# Patient Record
Sex: FEMALE | Race: BLACK OR AFRICAN AMERICAN | NOT HISPANIC OR LATINO | Employment: FULL TIME | URBAN - METROPOLITAN AREA
[De-identification: names, ages, dates, MRNs, and addresses within clinical notes are randomized per-mention and may not be internally consistent; named-entity substitution may affect disease eponyms.]

---

## 2017-02-08 ENCOUNTER — OFFICE VISIT (OUTPATIENT)
Dept: OBGYN | Facility: CLINIC | Age: 42
End: 2017-02-08
Payer: COMMERCIAL

## 2017-02-08 VITALS
HEIGHT: 67 IN | WEIGHT: 165 LBS | BODY MASS INDEX: 25.9 KG/M2 | DIASTOLIC BLOOD PRESSURE: 70 MMHG | SYSTOLIC BLOOD PRESSURE: 100 MMHG

## 2017-02-08 DIAGNOSIS — Z01.419 ENCOUNTER FOR GYNECOLOGICAL EXAMINATION WITHOUT ABNORMAL FINDING: Primary | ICD-10-CM

## 2017-02-08 PROCEDURE — G0145 SCR C/V CYTO,THINLAYER,RESCR: HCPCS | Performed by: OBSTETRICS & GYNECOLOGY

## 2017-02-08 PROCEDURE — 87591 N.GONORRHOEAE DNA AMP PROB: CPT | Performed by: OBSTETRICS & GYNECOLOGY

## 2017-02-08 PROCEDURE — 87624 HPV HI-RISK TYP POOLED RSLT: CPT | Performed by: OBSTETRICS & GYNECOLOGY

## 2017-02-08 PROCEDURE — 87491 CHLMYD TRACH DNA AMP PROBE: CPT | Performed by: OBSTETRICS & GYNECOLOGY

## 2017-02-08 PROCEDURE — 99386 PREV VISIT NEW AGE 40-64: CPT | Performed by: OBSTETRICS & GYNECOLOGY

## 2017-02-08 RX ORDER — LEVONORGESTREL/ETHIN.ESTRADIOL 0.1-0.02MG
1 TABLET ORAL DAILY
COMMUNITY

## 2017-02-08 RX ORDER — ACETAMINOPHEN AND CODEINE PHOSPHATE 120; 12 MG/5ML; MG/5ML
1 SOLUTION ORAL DAILY
Qty: 84 TABLET | Refills: 3 | Status: SHIPPED | OUTPATIENT
Start: 2017-02-08 | End: 2018-01-08

## 2017-02-08 NOTE — NURSING NOTE
Chief Complaint   Patient presents with     Physical       Initial There were no vitals taken for this visit. There is no height or weight on file to calculate BMI.  BP completed using cuff size: regular    No obstetric history on file.    The following HM Due: NONE      The following patient reported/Care Every where data was sent to:  P ABSTRACT QUALITY INITIATIVES [[id number]]  none     n/a

## 2017-02-08 NOTE — MR AVS SNAPSHOT
"              After Visit Summary   2/8/2017    Kirstin Ott    MRN: [id number]           Patient Information     Date Of Birth          1975        Visit Information        Provider Department      2/8/2017 3:30 PM Radha Padilla MD [facility]        Today's Diagnoses     Encounter for gynecological examination without abnormal finding    -  1        Follow-ups after your visit        Future tests that were ordered for you today     Open Future Orders        Priority Expected Expires Ordered    HIV Antigen Antibody Combo Routine  2/8/2018 2/8/2017    Anti Treponema Routine  2/8/2018 2/8/2017    Hepatitis B surface antigen Routine  2/8/2018 2/8/2017    Hepatitis C antibody Routine  2/8/2018 2/8/2017            Who to contact     If you have questions or need follow up information about today's clinic visit or your schedule please contact [facility] directly at [phone].  Normal or non-critical lab and imaging results will be communicated to you by MyChart, letter or phone within 4 business days after the clinic has received the results. If you do not hear from us within 7 days, please contact the clinic through Business e via [place] or phone. If you have a critical or abnormal lab result, we will notify you by phone as soon as possible.  Submit refill requests through [employer] or call your pharmacy and they will forward the refill request to us. Please allow 3 business days for your refill to be completed.          Additional Information About Your Visit        MyChart Information     Ar[employer]os lets you send messages to your doctor, view your test results, renew your prescriptions, schedule appointments and more. To sign up, go to [email or web address] . Click on \"Log in\" on the left side of the screen, which will take you to the Welcome page. Then click on \"Sign up Now\" on the right side of the page.     You will be asked to enter the access code listed below, as well as some personal " "information. Please follow the directions to create your username and password.     Your access code is: [email or web address]  Expires: 2017  4:27 PM     Your access code will  in 90 days. If you need help or a new code, please call your [place] clinic or [phone].        Care EveryWhere ID     This is your Care EveryWhere ID. This could be used by other organizations to access your [place] medical records  [id number]        Your Vitals Were     Height BMI (Body Mass Index) Last Period Breastfeeding?          5' 6.5\" (1.689 m) 26.24 kg/m2 2017 (Approximate) No         Blood Pressure from Last 3 Encounters:   17 100/70    Weight from Last 3 Encounters:   17 165 lb (74.844 kg)              We Performed the Following     CHLAMYDIA TRACHOMATIS PCR     HPV High Risk Types DNA Cervical     NEISSERIA GONORRHOEA PCR     Pap imaged thin layer screen with HPV - recommended age 30 - 65 years (select HPV order below)          Today's Medication Changes          These changes are accurate as of: 17  4:27 PM.  If you have any questions, ask your nurse or doctor.               Start taking these medicines.        Dose/Directions    norethindrone 0.35 MG per tablet   Commonly known as:  MICRONOR   Used for:  Encounter for gynecological examination without abnormal finding   Started by:  Radha Padilla MD        Dose:  1 tablet   Take 1 tablet (0.35 mg) by mouth daily   Quantity:  84 tablet   Refills:  3            Where to get your medicines      These medications were sent to [facility] [address] [place], [place] - [address]  [address], [place] [address]     Phone:  [phone]    - norethindrone 0.35 MG per tablet             Primary Care Provider Office Phone # Fax #    Radha Padilla [phone]       [facility] [address] [facility] [address]  [facility] [address]        Thank you!     Thank you for choosing [facility]  for your care. Our goal is " always to provide you with excellent care. Hearing back from our patients is one way we can continue to improve our services. Please take a few minutes to complete the written survey that you may receive in the mail after your visit with us. Thank you!             Your Updated Medication List - Protect others around you: Learn how to safely use, store and throw away your medicines at www.disposemymeds.org.          This list is accurate as of: 2/8/17  4:27 PM.  Always use your most recent med list.                   Brand Name Dispense Instructions for use    levonorgestrel-ethinyl estradiol 0.1-20 MG-MCG per tablet    BJORN YIN LESSINA     Take 1 tablet by mouth daily       norethindrone 0.35 MG per tablet    MICRONOR    84 tablet    Take 1 tablet (0.35 mg) by mouth daily

## 2017-02-08 NOTE — LETTER
[facility]  [address]  [facility] [address] [phone]      February 16, 2017    Kirstin Ott  [address]  [facility] [address]    Dear Kirstin,  We are happy to inform you that your PAP smear result from 2/8/17 is normal.  We are now able to do a follow up test on PAP smears. The DNA test is for HPV (Human Papilloma Virus). Cervical cancer is closely linked with certain types of HPV. Your result showed no evidence of high risk HPV.  Therefore we recommend you return in 3 years for your next pap smear.  You will still need to return to the clinic every year for an annual exam and other preventive tests.  Please contact the clinic with any questions.  Sincerely,  Radha Padilla MD/kennedi

## 2017-02-08 NOTE — PROGRESS NOTES
Kirstin is a 41 year old female who presents for annual exam.   She has moved here from [place], last pap , will send pap/HPV test today, discussed current screening guidelines.  Originally from [place].  Has been using oral contraceptive pills, but smokes (socially), not ready to quit, discussed alternatives.  Doesn't want a device (Mirena, Nexplanon), or Depo shots (bled in the past with them).  Willing to try progestin-only pills, if she decides to quit smoking could consider combination.  Interested in STD testing, will send GC/CT today, send future HIV/RPR/Hepatitis screen, she will decide if she wants to complete these.  Discussed mammogram guidelines.      Menses are regular q 28-30 days and normal lasting 4 days.  Menses flow: varies.  Patient's last menstrual period was 2017 (approximate).. Using oral contraceptives for contraception.  She is not currently considering pregnancy.  Besides routine health maintenance, she has no other health concerns today .  GYNECOLOGIC HISTORY:  Menarche: unsure    Kirstin is sexually active with 0male partner(s) and is currently in monogamous relationship with no one in particular.    History sexually transmitted infections:No STD history  STI testing offered?  Accepted  RASTA exposure: No  History of abnormal Pap smear: NO - age 30- 65 PAP every 3 years recommended  Family history of breast CA: No  Family history of uterine/ovarian CA: No    Family history of colon CA: No    HEALTH MAINTENANCE:  Cholesterol: (No results found for: CHOL History of abnormal lipids: No  Mammo: 0 . History of abnormal Mammo: Not applicable.  Regular Self Breast Exams: Yes  Calcium/Vitamin D intake: source:  dairy Adequate? Yes  TSH: (No results found for: TSH )  Pap; (No results found for this basename: pap )    HISTORY:  Obstetric History       T0      TAB0   SAB0   E0   M0   L0         Past Medical History   Diagnosis Date     NO ACTIVE PROBLEMS      Past Surgical  "History   Procedure Laterality Date     C nonspecific procedure       ?ovarian cyst in childhood     No family history on file.  Social History     Social History     Marital Status: Single     Spouse Name: N/A     Number of Children: N/A     Years of Education: N/A     Social History Main Topics     Smoking status: Not on file     Smokeless tobacco: Not on file     Alcohol Use: Not on file     Drug Use: Not on file     Sexual Activity: Not on file     Other Topics Concern     Not on file     Social History Narrative     No narrative on file       Current outpatient prescriptions:      levonorgestrel-ethinyl estradiol (AVIANE,ALESSE,LESSINA) 0.1-20 MG-MCG per tablet, Take 1 tablet by mouth daily, Disp: , Rfl:      norethindrone (MICRONOR) 0.35 MG per tablet, Take 1 tablet (0.35 mg) by mouth daily, Disp: 84 tablet, Rfl: 3   No Known Allergies    Past medical, surgical, social and family history were reviewed and updated in EPIC.    ROS:   C:     NEGATIVE for fever, chills, change in weight  I:       NEGATIVE for worrisome rashes, moles or lesions  E:     NEGATIVE for vision changes or irritation  E/M: NEGATIVE for ear, mouth and throat problems  R:     NEGATIVE for significant cough or SOB  CV:   NEGATIVE for chest pain, palpitations or peripheral edema  GI:     NEGATIVE for nausea, abdominal pain, heartburn, or change in bowel habits  :   NEGATIVE for frequency, dysuria, hematuria, vaginal discharge, or irregular bleeding  M:     NEGATIVE for significant arthralgias or myalgia  N:      NEGATIVE for weakness, dizziness or paresthesias  E:      NEGATIVE for temperature intolerance, skin/hair changes  P:      NEGATIVE for changes in mood or affect.    EXAM:  /70 mmHg  Ht 5' 6.5\" (1.689 m)  Wt 165 lb (74.844 kg)  BMI 26.24 kg/m2  LMP 01/11/2017 (Approximate)  Breastfeeding? No   BMI: Body mass index is 26.24 kg/(m^2).  Constitutional: healthy, alert and no distress  Head: Normocephalic. No masses, lesions, " tenderness or abnormalities  Neck: Neck supple. Trachea midline. No adenopathy. Thyroid symmetric, normal size.   Cardiovascular: RRR.   Respiratory: Negative.   Breast: No nodularity, asymmetry or nipple discharge bilaterally.  Gastrointestinal: Abdomen soft, non-tender, non-distended. No masses, organomegaly.  :  Vulva:  No external lesions, normal female hair distribution, no inguinal adenopathy.    Urethra:  Midline, non-tender, well supported, no discharge  Vagina:  Moist, pink, no abnormal discharge, no lesions  Uterus:  Normal size, non-tender, freely mobile  Ovaries:  No masses appreciated, non-tender, mobile  Rectal Exam: deferred  Musculoskeletal: extremities normal  Skin: no suspicious lesions or rashes  Psychiatric: Affect appropriate, cooperative,mentation appears normal.     COUNSELING:      reports that she has been passively smoking.  She does not have any smokeless tobacco history on file.  Tobacco Cessation Action Plan: Information offered: Patient not interested at this time  Body mass index is 26.24 kg/(m^2).  Weight management plan: diet and exercise  FRAX Risk Assessment    ASSESSMENT:  41 year old female with satisfactory annual exam  (Z01.419) Encounter for gynecological examination without abnormal finding  (primary encounter diagnosis)  Comment:   Plan: norethindrone (MICRONOR) 0.35 MG per tablet,         NEISSERIA GONORRHOEA PCR, CHLAMYDIA TRACHOMATIS        PCR, Pap imaged thin layer screen with HPV -         recommended age 30 - 65 years (select HPV order        below), HPV High Risk Types DNA Cervical

## 2017-02-08 NOTE — Clinical Note
[facility]  [address]  [facility] [id number] [phone]      February 10, 2017      Kirstin Ott  [address]  [facility] [address]              Dear Kirstin,    Your recent gonorrhea and chlamydia cultures were negative.  If you have any questions please call the nurse line at [phone].      Sincerely,      Radha Padilla MD/vinicio

## 2017-02-10 LAB
C TRACH DNA SPEC QL NAA+PROBE: NORMAL
N GONORRHOEA DNA SPEC QL NAA+PROBE: NORMAL
SPECIMEN SOURCE: NORMAL
SPECIMEN SOURCE: NORMAL

## 2017-02-13 LAB
COPATH REPORT: NORMAL
PAP: NORMAL

## 2017-02-14 LAB
FINAL DIAGNOSIS: NORMAL
HPV HR 12 DNA CVX QL NAA+PROBE: NEGATIVE
HPV16 DNA SPEC QL NAA+PROBE: NEGATIVE
HPV18 DNA SPEC QL NAA+PROBE: NEGATIVE
SPECIMEN DESCRIPTION: NORMAL

## 2017-02-20 DIAGNOSIS — Z01.419 ENCOUNTER FOR GYNECOLOGICAL EXAMINATION WITHOUT ABNORMAL FINDING: ICD-10-CM

## 2017-02-20 PROCEDURE — 86803 HEPATITIS C AB TEST: CPT | Performed by: OBSTETRICS & GYNECOLOGY

## 2017-02-20 PROCEDURE — 87340 HEPATITIS B SURFACE AG IA: CPT | Performed by: OBSTETRICS & GYNECOLOGY

## 2017-02-20 PROCEDURE — 87389 HIV-1 AG W/HIV-1&-2 AB AG IA: CPT | Performed by: OBSTETRICS & GYNECOLOGY

## 2017-02-20 PROCEDURE — 86780 TREPONEMA PALLIDUM: CPT | Performed by: OBSTETRICS & GYNECOLOGY

## 2017-02-20 PROCEDURE — 36415 COLL VENOUS BLD VENIPUNCTURE: CPT | Performed by: OBSTETRICS & GYNECOLOGY

## 2017-02-21 LAB
HBV SURFACE AG SERPL QL IA: NONREACTIVE
HCV AB SERPL QL IA: NORMAL
HIV 1+2 AB+HIV1 P24 AG SERPL QL IA: NORMAL
T PALLIDUM IGG+IGM SER QL: NEGATIVE

## 2018-01-08 DIAGNOSIS — Z01.419 ENCOUNTER FOR GYNECOLOGICAL EXAMINATION WITHOUT ABNORMAL FINDING: ICD-10-CM

## 2018-01-08 RX ORDER — ACETAMINOPHEN AND CODEINE PHOSPHATE 120; 12 MG/5ML; MG/5ML
1 SOLUTION ORAL DAILY
Qty: 84 TABLET | Refills: 0 | Status: SHIPPED | OUTPATIENT
Start: 2018-01-08

## 2019-02-17 ENCOUNTER — APPOINTMENT (OUTPATIENT)
Dept: CT IMAGING | Facility: CLINIC | Age: 44
End: 2019-02-17
Attending: EMERGENCY MEDICINE
Payer: COMMERCIAL

## 2019-02-17 ENCOUNTER — OFFICE VISIT (OUTPATIENT)
Dept: URGENT CARE | Facility: URGENT CARE | Age: 44
End: 2019-02-17
Payer: COMMERCIAL

## 2019-02-17 ENCOUNTER — HOSPITAL ENCOUNTER (EMERGENCY)
Facility: CLINIC | Age: 44
Discharge: HOME | End: 2019-02-17
Attending: EMERGENCY MEDICINE | Admitting: EMERGENCY MEDICINE
Payer: COMMERCIAL

## 2019-02-17 VITALS
HEIGHT: 65 IN | RESPIRATION RATE: 18 BRPM | WEIGHT: 175 LBS | OXYGEN SATURATION: 100 % | BODY MASS INDEX: 29.16 KG/M2 | SYSTOLIC BLOOD PRESSURE: 118 MMHG | DIASTOLIC BLOOD PRESSURE: 71 MMHG | TEMPERATURE: 98.4 F

## 2019-02-17 VITALS
BODY MASS INDEX: 27.32 KG/M2 | HEIGHT: 66 IN | WEIGHT: 170 LBS | DIASTOLIC BLOOD PRESSURE: 88 MMHG | SYSTOLIC BLOOD PRESSURE: 128 MMHG

## 2019-02-17 DIAGNOSIS — K57.32 DIVERTICULITIS OF COLON: ICD-10-CM

## 2019-02-17 DIAGNOSIS — R10.84 ABDOMINAL PAIN, GENERALIZED: Primary | ICD-10-CM

## 2019-02-17 LAB
ALBUMIN SERPL-MCNC: 3.4 G/DL (ref 3.4–5)
ALBUMIN SERPL-MCNC: 3.7 G/DL (ref 3.4–5)
ALBUMIN UR-MCNC: NEGATIVE MG/DL
ALP SERPL-CCNC: 59 U/L (ref 40–150)
ALP SERPL-CCNC: 61 U/L (ref 40–150)
ALT SERPL W P-5'-P-CCNC: 18 U/L (ref 0–50)
ALT SERPL W P-5'-P-CCNC: 19 U/L (ref 0–50)
ANION GAP SERPL CALCULATED.3IONS-SCNC: 2 MMOL/L (ref 3–14)
ANION GAP SERPL CALCULATED.3IONS-SCNC: 8 MMOL/L (ref 3–14)
APPEARANCE UR: CLEAR
AST SERPL W P-5'-P-CCNC: 11 U/L (ref 0–45)
AST SERPL W P-5'-P-CCNC: 12 U/L (ref 0–45)
BASOPHILS # BLD AUTO: 0 10E9/L (ref 0–0.2)
BASOPHILS NFR BLD AUTO: 0.2 %
BILIRUB SERPL-MCNC: 0.2 MG/DL (ref 0.2–1.3)
BILIRUB SERPL-MCNC: 0.4 MG/DL (ref 0.2–1.3)
BILIRUB UR QL STRIP: NEGATIVE
BUN SERPL-MCNC: 7 MG/DL (ref 7–30)
BUN SERPL-MCNC: 9 MG/DL (ref 7–30)
CALCIUM SERPL-MCNC: 8.3 MG/DL (ref 8.5–10.1)
CALCIUM SERPL-MCNC: 8.3 MG/DL (ref 8.5–10.1)
CHLORIDE SERPL-SCNC: 105 MMOL/L (ref 94–109)
CHLORIDE SERPL-SCNC: 106 MMOL/L (ref 94–109)
CO2 SERPL-SCNC: 23 MMOL/L (ref 20–32)
CO2 SERPL-SCNC: 27 MMOL/L (ref 20–32)
COLOR UR AUTO: YELLOW
CREAT SERPL-MCNC: 0.63 MG/DL (ref 0.52–1.04)
CREAT SERPL-MCNC: 0.65 MG/DL (ref 0.52–1.04)
DIFFERENTIAL METHOD BLD: ABNORMAL
EOSINOPHIL # BLD AUTO: 0.2 10E9/L (ref 0–0.7)
EOSINOPHIL NFR BLD AUTO: 1 %
ERYTHROCYTE [DISTWIDTH] IN BLOOD BY AUTOMATED COUNT: 12.4 % (ref 10–15)
GFR SERPL CREATININE-BSD FRML MDRD: >90 ML/MIN/{1.73_M2}
GFR SERPL CREATININE-BSD FRML MDRD: >90 ML/MIN/{1.73_M2}
GLUCOSE SERPL-MCNC: 86 MG/DL (ref 70–99)
GLUCOSE SERPL-MCNC: 95 MG/DL (ref 70–99)
GLUCOSE UR STRIP-MCNC: NEGATIVE MG/DL
HCG SERPL QL: NEGATIVE
HCT VFR BLD AUTO: 39.1 % (ref 35–47)
HGB BLD-MCNC: 12.7 G/DL (ref 11.7–15.7)
HGB UR QL STRIP: ABNORMAL
KETONES UR STRIP-MCNC: ABNORMAL MG/DL
LEUKOCYTE ESTERASE UR QL STRIP: NEGATIVE
LIPASE SERPL-CCNC: 78 U/L (ref 73–393)
LYMPHOCYTES # BLD AUTO: 1.8 10E9/L (ref 0.8–5.3)
LYMPHOCYTES NFR BLD AUTO: 11.6 %
MCH RBC QN AUTO: 28.8 PG (ref 26.5–33)
MCHC RBC AUTO-ENTMCNC: 32.5 G/DL (ref 31.5–36.5)
MCV RBC AUTO: 89 FL (ref 78–100)
MONOCYTES # BLD AUTO: 0.9 10E9/L (ref 0–1.3)
MONOCYTES NFR BLD AUTO: 6 %
NEUTROPHILS # BLD AUTO: 12.3 10E9/L (ref 1.6–8.3)
NEUTROPHILS NFR BLD AUTO: 81.2 %
NITRATE UR QL: NEGATIVE
PH UR STRIP: 8 PH (ref 5–7)
PLATELET # BLD AUTO: 343 10E9/L (ref 150–450)
POTASSIUM SERPL-SCNC: 4 MMOL/L (ref 3.4–5.3)
POTASSIUM SERPL-SCNC: 4 MMOL/L (ref 3.4–5.3)
PROT SERPL-MCNC: 7.3 G/DL (ref 6.8–8.8)
PROT SERPL-MCNC: 7.3 G/DL (ref 6.8–8.8)
RBC # BLD AUTO: 4.41 10E12/L (ref 3.8–5.2)
RBC #/AREA URNS AUTO: ABNORMAL /HPF
SODIUM SERPL-SCNC: 134 MMOL/L (ref 133–144)
SODIUM SERPL-SCNC: 137 MMOL/L (ref 133–144)
SOURCE: ABNORMAL
SP GR UR STRIP: 1.02 (ref 1–1.03)
UROBILINOGEN UR STRIP-ACNC: 1 EU/DL (ref 0.2–1)
WBC # BLD AUTO: 15.1 10E9/L (ref 4–11)
WBC #/AREA URNS AUTO: ABNORMAL /HPF

## 2019-02-17 PROCEDURE — 84703 CHORIONIC GONADOTROPIN ASSAY: CPT | Performed by: EMERGENCY MEDICINE

## 2019-02-17 PROCEDURE — 25000125 ZZHC RX 250: Performed by: EMERGENCY MEDICINE

## 2019-02-17 PROCEDURE — 80053 COMPREHEN METABOLIC PANEL: CPT | Performed by: EMERGENCY MEDICINE

## 2019-02-17 PROCEDURE — 96361 HYDRATE IV INFUSION ADD-ON: CPT

## 2019-02-17 PROCEDURE — 25000128 H RX IP 250 OP 636: Performed by: EMERGENCY MEDICINE

## 2019-02-17 PROCEDURE — 87086 URINE CULTURE/COLONY COUNT: CPT | Performed by: FAMILY MEDICINE

## 2019-02-17 PROCEDURE — 74177 CT ABD & PELVIS W/CONTRAST: CPT

## 2019-02-17 PROCEDURE — 99285 EMERGENCY DEPT VISIT HI MDM: CPT | Mod: 25

## 2019-02-17 PROCEDURE — 83690 ASSAY OF LIPASE: CPT | Performed by: EMERGENCY MEDICINE

## 2019-02-17 PROCEDURE — 80053 COMPREHEN METABOLIC PANEL: CPT | Performed by: FAMILY MEDICINE

## 2019-02-17 PROCEDURE — 25000132 ZZH RX MED GY IP 250 OP 250 PS 637: Performed by: EMERGENCY MEDICINE

## 2019-02-17 PROCEDURE — 36415 COLL VENOUS BLD VENIPUNCTURE: CPT | Performed by: FAMILY MEDICINE

## 2019-02-17 PROCEDURE — 81001 URINALYSIS AUTO W/SCOPE: CPT | Performed by: FAMILY MEDICINE

## 2019-02-17 PROCEDURE — 96374 THER/PROPH/DIAG INJ IV PUSH: CPT | Mod: 59

## 2019-02-17 PROCEDURE — 85025 COMPLETE CBC W/AUTO DIFF WBC: CPT | Performed by: FAMILY MEDICINE

## 2019-02-17 PROCEDURE — 99205 OFFICE O/P NEW HI 60 MIN: CPT | Performed by: FAMILY MEDICINE

## 2019-02-17 RX ORDER — IOPAMIDOL 755 MG/ML
88 INJECTION, SOLUTION INTRAVASCULAR ONCE
Status: COMPLETED | OUTPATIENT
Start: 2019-02-17 | End: 2019-02-17

## 2019-02-17 RX ORDER — CIPROFLOXACIN 500 MG/1
500 TABLET, FILM COATED ORAL ONCE
Status: COMPLETED | OUTPATIENT
Start: 2019-02-17 | End: 2019-02-17

## 2019-02-17 RX ORDER — KETOROLAC TROMETHAMINE 15 MG/ML
15 INJECTION, SOLUTION INTRAMUSCULAR; INTRAVENOUS ONCE
Status: COMPLETED | OUTPATIENT
Start: 2019-02-17 | End: 2019-02-17

## 2019-02-17 RX ORDER — ONDANSETRON 2 MG/ML
4 INJECTION INTRAMUSCULAR; INTRAVENOUS EVERY 30 MIN PRN
Status: DISCONTINUED | OUTPATIENT
Start: 2019-02-17 | End: 2019-02-17 | Stop reason: HOSPADM

## 2019-02-17 RX ORDER — METRONIDAZOLE 500 MG/1
500 TABLET ORAL 4 TIMES DAILY
Qty: 40 TABLET | Refills: 0 | Status: SHIPPED | OUTPATIENT
Start: 2019-02-17 | End: 2019-02-27

## 2019-02-17 RX ORDER — ONDANSETRON 4 MG/1
4 TABLET, ORALLY DISINTEGRATING ORAL EVERY 6 HOURS PRN
Qty: 10 TABLET | Refills: 0 | Status: SHIPPED | OUTPATIENT
Start: 2019-02-17 | End: 2019-02-20

## 2019-02-17 RX ORDER — METRONIDAZOLE 500 MG/1
500 TABLET ORAL ONCE
Status: COMPLETED | OUTPATIENT
Start: 2019-02-17 | End: 2019-02-17

## 2019-02-17 RX ORDER — HYDROMORPHONE HYDROCHLORIDE 1 MG/ML
0.5 INJECTION, SOLUTION INTRAMUSCULAR; INTRAVENOUS; SUBCUTANEOUS
Status: DISCONTINUED | OUTPATIENT
Start: 2019-02-17 | End: 2019-02-17 | Stop reason: HOSPADM

## 2019-02-17 RX ORDER — CIPROFLOXACIN 500 MG/1
500 TABLET, FILM COATED ORAL 2 TIMES DAILY
Qty: 20 TABLET | Refills: 0 | Status: SHIPPED | OUTPATIENT
Start: 2019-02-17 | End: 2019-02-27

## 2019-02-17 RX ORDER — HYDROCODONE BITARTRATE AND ACETAMINOPHEN 5; 325 MG/1; MG/1
1-2 TABLET ORAL EVERY 4 HOURS PRN
Qty: 20 TABLET | Refills: 0 | Status: SHIPPED | OUTPATIENT
Start: 2019-02-17 | End: 2019-02-20

## 2019-02-17 RX ORDER — SODIUM CHLORIDE 9 MG/ML
1000 INJECTION, SOLUTION INTRAVENOUS CONTINUOUS
Status: DISCONTINUED | OUTPATIENT
Start: 2019-02-17 | End: 2019-02-17 | Stop reason: HOSPADM

## 2019-02-17 RX ADMIN — SODIUM CHLORIDE 67 ML: 9 INJECTION, SOLUTION INTRAVENOUS at 13:12

## 2019-02-17 RX ADMIN — METRONIDAZOLE 500 MG: 500 TABLET ORAL at 13:39

## 2019-02-17 RX ADMIN — SODIUM CHLORIDE 1000 ML: 9 INJECTION, SOLUTION INTRAVENOUS at 12:23

## 2019-02-17 RX ADMIN — IOPAMIDOL 88 ML: 755 INJECTION, SOLUTION INTRAVENOUS at 13:12

## 2019-02-17 RX ADMIN — CIPROFLOXACIN HYDROCHLORIDE 500 MG: 500 TABLET, FILM COATED ORAL at 13:39

## 2019-02-17 RX ADMIN — KETOROLAC TROMETHAMINE 15 MG: 15 INJECTION, SOLUTION INTRAMUSCULAR; INTRAVENOUS at 12:25

## 2019-02-17 ASSESSMENT — ENCOUNTER SYMPTOMS
DYSURIA: 0
NAUSEA: 1
HEMATURIA: 0
FEVER: 0
ABDOMINAL PAIN: 1
VOMITING: 0

## 2019-02-17 ASSESSMENT — MIFFLIN-ST. JEOR
SCORE: 1439.92
SCORE: 1454.67

## 2019-02-17 NOTE — ED PROVIDER NOTES
"  History     Chief Complaint:  Abdominal pain    HPI   Kirstin Ott is a 43 year old female with a history of diverticulitis who presents to the emergency department today for evaluation of abdominal pain. Patient states she has been having left lower quadrant abdominal pain and pressure for the past 3 days. Her pain is exacerbated with exertion. She endorses that her pain progressively worsened today so she went to urgent care and they advised her to go to the ED for further evaluation. Patient states she has a history of diverticulitis, however, the pain today is worse than what it was in the past. Additionally, she started feeling nauseous when she was up walking around today. She denies fever, dysuria, hematuria, vomiting, or vaginal bleeding.    Allergies:  No Known Drug Allergies    Medications:    Micronor     Past Medical History:    Diverticulitis  Ovarian cyst    Past Surgical History:    Surgical history reviewed. No pertinent surgical history.    Family History:    Family history reviewed. No pertinent family history.    Social History:  The patient was accompanied to the ED by boyfriend.  Smoking Status: Never Smoker  Smokeless Tobacco: Never Used  Alcohol Use: Negative  Drug Use: Negative  Marital Status:  Single     Review of Systems   Constitutional: Negative for fever.   Gastrointestinal: Positive for abdominal pain and nausea. Negative for vomiting.   Genitourinary: Negative for dysuria, hematuria and vaginal bleeding.   All other systems reviewed and are negative.        Physical Exam     Patient Vitals for the past 24 hrs:   BP Temp Temp src Heart Rate Resp SpO2 Height Weight   02/17/19 1340 118/71 -- -- 89 18 100 % -- --   02/17/19 1228 -- -- -- 100 -- -- -- --   02/17/19 1144 138/80 98.4  F (36.9  C) Oral 102 16 100 % 1.651 m (5' 5\") 79.4 kg (175 lb)       Physical Exam  Nursing note and vitals reviewed.  Constitutional:  Oriented to person, place, and time. Cooperative.   HENT:   Nose: "    Nose normal.   Mouth/Throat:   Mucous membranes are normal.   Eyes:    Conjunctivae normal and EOM are normal.      Pupils are equal, round, and reactive to light.   Neck:    Trachea normal.   Cardiovascular:  Normal rate, regular rhythm, normal heart sounds and normal pulses. No murmur heard.  Pulmonary/Chest:  Effort normal and breath sounds normal.   Abdominal:   Soft. Normal appearance and bowel sounds are normal.      Tenderness to palpation in the left lower quadrant region.      There is no rebound and no CVA tenderness.   Musculoskeletal:  Extremities atraumatic x 4.   Lymphadenopathy:  No cervical adenopathy.   Neurological:   Alert and oriented to person, place, and time. Normal strength.      No cranial nerve deficit or sensory deficit. GCS eye subscore is 4. GCS verbal subscore is 5. GCS motor subscore is 6.   Skin:    Skin is intact. No rash noted.   Psychiatric:   Normal mood and affect.    Emergency Department Course     Imaging:  Radiology findings were communicated with the patient who voiced understanding of the findings.    CT Abdomen Pelvis w Contrast  Mild diverticulitis of the midportion of the descending  colon. There is no evidence of perforation or an abscess.  PEPE BLACK MD  Reading per radiology    Laboratory:  Laboratory findings were communicated with the patient who voiced understanding of the findings.    Lipase: 78  HCG qualitative: negative  CMP: calcium 8.3(L) o/w WNL (Creatinine 0.63)    Interventions:  1223 NS 1000 ml IV  1225 Toradol 15 mg IV  1339 Cipro 500 mg PO  1339 Flagyl 500 mg PO    Emergency Department Course:    1214 Nursing notes and vitals reviewed.    1217 I performed an exam of the patient as documented above.     1223 IV was inserted and blood was drawn for laboratory testing, results above.    1258 The patient was sent for a CT abdomen pelvis while in the emergency department, results above.     1330 Patient rechecked and updated.     1349 I personally  reviewed the lab and image results with the patient and answered all related questions prior to discharge.    Impression & Plan      Medical Decision Making:  This is a 43-year-old female came in with 3 days of left lower quadrant abdominal pain.  She was first seen at an urgent care, where she had a CBC obtained and a UA.  Her WBC came back elevated, and therefore she was referred here.  Unfortunately, her other blood work as yet to result from the urgent care and therefore we did have to repeat that today.  I felt it was reasonable to proceed with that blood work as well as a pregnancy test and a CT scan, as I was concerned that she might have recurrent diverticulitis.  There was also her concern.  She did not want anything strong for pain and therefore was provided IV Toradol.  She was also provided IV fluids.  She then had her CT scan obtained, which shows the above findings.  I also informed the patient of the uterine fibroids and that she should discuss those with her OB/GYN.  She does not appear to have an abscess or perforation, and she is not septic.  She also does not have a fever here or vomiting, and therefore I feel that she is safe for discharge and outpatient management.  She was provided her first oral doses of Cipro and Flagyl here, and I will send her home with prescriptions for both as well as Norco and Zofran.  She is to follow-up with her own physician as soon as possible and certainly return with any concerns or worsening symptoms and specifically increasing pain, vomiting, or fevers.    Diagnosis:    ICD-10-CM    1. Diverticulitis of colon K57.32      Disposition:   The patient is discharged to home.    Discharge Medications:     START taking      Dose / Directions   ciprofloxacin 500 MG tablet  Commonly known as:  CIPRO      Dose:  500 mg  Take 1 tablet (500 mg) by mouth 2 times daily for 10 days  Quantity:  20 tablet  Refills:  0     HYDROcodone-acetaminophen 5-325 MG tablet  Commonly known  as:  NORCO      Dose:  1-2 tablet  Take 1-2 tablets by mouth every 4 hours as needed for pain  Quantity:  20 tablet  Refills:  0     metroNIDAZOLE 500 MG tablet  Commonly known as:  FLAGYL      Dose:  500 mg  Take 1 tablet (500 mg) by mouth 4 times daily for 10 days  Quantity:  40 tablet  Refills:  0     ondansetron 4 MG ODT tab  Commonly known as:  ZOFRAN ODT      Dose:  4 mg  Take 1 tablet (4 mg) by mouth every 6 hours as needed for nausea  Quantity:  10 tablet  Refills:  0           Where to get your medicines      Some of these will need a paper prescription and others can be bought over the counter. Ask your nurse if you have questions.    Bring a paper prescription for each of these medications    ciprofloxacin 500 MG tablet    HYDROcodone-acetaminophen 5-325 MG tablet    metroNIDAZOLE 500 MG tablet    ondansetron 4 MG ODT tab         Scribe Disclosure:  Kim ARREDONDO, am serving as a scribe at 1:55 PM on 2/17/2019 to document services personally performed by Damon Patel MD based on my observations and the provider's statements to me.     EMERGENCY DEPARTMENT       Damon Patel MD  02/17/19 2613

## 2019-02-17 NOTE — ED AVS SNAPSHOT
Emergency Department  [address] [place] [address]  Phone:  [phone]  Fax:  [phone]                                    Kirstin Ott   MRN: [id number]    Department:   Emergency Department   Date of Visit:  2/17/2019           After Visit Summary Signature Page    I have received my discharge instructions, and my questions have been answered. I have discussed any challenges I see with this plan with the nurse or doctor.    ..........................................................................................................................................  Patient/Patient Representative Signature      ..........................................................................................................................................  Patient Representative Print Name and Relationship to Patient    ..................................................               ................................................  Date                                   Time    ..........................................................................................................................................  Reviewed by Signature/Title    ...................................................              ..............................................  Date                                               Time          22EPIC Rev 08/18        normal...

## 2019-02-17 NOTE — DISCHARGE INSTRUCTIONS
Discharge Instructions  Diverticulitis  Your provider has diagnosed you with diverticulitis.  Diverticulitis is an infection of a diverticulum, which is a tiny sack-like structure that protrudes off the wall of the colon (large intestine).  These sacks are created over years of increased pressure in the colon (this is diverticulosis), usually as a result of a diet without enough fruits, vegetables, and whole grains. Because these sacks are small, bacteria can get trapped inside them and cause an infection (this is divericulitis).  This infection often causes abdominal (belly) pain, fever, nausea (sick to stomach), and vomiting (throwing up). Diverticulitis is usually treated at home.  However, sometimes diverticulitis needs treatment in the hospital and may even need surgery.    Generally, every Emergency Department visit should have a follow-up clinic visit with either a primary or a specialty clinic/provider. Please follow-up as instructed by your emergency provider today.    Return to the Emergency Department if:   You get an oral temperature above 102oF or as directed by your provider.  You have blood in your stools (bright red or black, tarry stools), or have blood in your vomit.  You keep vomiting or cannot drink liquids or cannot keep your medicine down.  You cannot have a bowel movement or you cannot pass gas.  Your stomach gets bloated or bigger.  You faint or become very weak.  Your pain is too bad to tolerate.  You have new symptoms or anything that worries you.    What can I do to help myself?  Fill any antibiotic prescriptions the provider gave you and take them right away. Be sure to finish the whole antibiotic prescription.  For the first day or two at home drink only clear liquids.  This lets your intestines rest.  If your pain has improved after one or two days, you may start eating mild foods. Soda crackers, toast, plain noodles, gelatin, applesauce and bananas are good first choices.  Avoid foods  "that have acid, are spicy, fatty or fibrous (such as meats, coarse grains, vegetables). You may start eating these foods again in about 3-4 days when you are better.  Once you are back to normal, eat a high fiber diet of fruits, vegetables and whole grains. Some people think you should avoid eating nuts, seeds, and corn, but there is no definite proof this makes any difference in whether you will get diverticulitis again.   Pain and fever can be treated with Tylenol  (acetaminophen) or you might have been prescribed a pain medication.    Probiotics: If you have been given an antibiotic, you may want to also take a probiotic pill or eat yogurt with live cultures. Probiotics have \"good bacteria\" to help your intestines stay healthy. Studies have shown that probiotics help prevent diarrhea and other intestine problems (including C. diff infection) when you take antibiotics. You can buy these without a prescription in the pharmacy section of the store.  If you were given a prescription for medicine here today, be sure to read all of the information (including the package insert) that comes with your prescription.  This will include important information about the medicine, its side effects, and any warnings that you need to know about.  The pharmacist who fills the prescription can provide more information and answer questions you may have about the medicine.  If you have questions or concerns that the pharmacist cannot address, please call or return to the Emergency Department.     Remember that you can always come back to the Emergency Department if you are not able to see your regular provider in the amount of time listed above, if you get any new symptoms, or if there is anything that worries you.    "

## 2019-02-17 NOTE — PROGRESS NOTES
"SUBJECTIVE  New patient to [facility]    HPI:  Kirstin Ott is a 43 year old female who presents with the CC of abdominal pain.    Patient complain of left sided abdominal pain for the past 3 days, worsening since Friday afternoon.  Endorse bloating.  Denies any fever.  Endorsed fatigue.  Denies any dysuria or frequency.  Patient had diverticulitis about 4 years ago.  No prior abdominal surgeries.  Last BM this morning, has hard small stools.  Usual BM daily.  Patient states that has not been eating much as she has been scared, is drinking fluids.  No vomiting but has felt nauseated.    Patient is on OCP, denies possibility of pregnancy.    Family history - negative for colitis, diverticulitis    Past Medical History:   Diagnosis Date     NO ACTIVE PROBLEMS      Current Outpatient Medications   Medication Sig Dispense Refill     levonorgestrel-ethinyl estradiol (AVIANE,ALESSE,LESSINA) 0.1-20 MG-MCG per tablet Take 1 tablet by mouth daily       norethindrone (MICRONOR) 0.35 MG per tablet Take 1 tablet (0.35 mg) by mouth daily Due for annual exam 2/2018 84 tablet 0     Social History     Tobacco Use     Smoking status: Passive Smoke Exposure - Never Smoker     Smokeless tobacco: Never Used   Substance Use Topics     Alcohol use: Yes     Alcohol/week: 0.0 oz       ROS:  Review of systems negative except as stated above.    OBJECTIVE:  /88   Ht 1.664 m (5' 5.5\")   Wt 77.1 kg (170 lb)   LMP 02/10/2019   Breastfeeding? No   BMI 27.86 kg/m    GENERAL APPEARANCE: healthy, alert and no distress  EYES: EOMI,  PERRL, conjunctiva clear  RESP: lungs clear to auscultation - no rales, rhonchi or wheezes  CV: regular rates and rhythm, normal S1 S2, no murmur noted  ABDOMEN:  soft, tenderness on left side, bowel sounds present  Extremities: no peripheral edema or tenderness, peripheral pulses normal  PSYCH: mentation appears normal and affect normal/bright    Results for orders placed or performed in visit on 02/17/19 "   CBC with platelets and differential   Result Value Ref Range    WBC 15.1 (H) 4.0 - 11.0 10e9/L    RBC Count 4.41 3.8 - 5.2 10e12/L    Hemoglobin 12.7 11.7 - 15.7 g/dL    Hematocrit 39.1 35.0 - 47.0 %    MCV 89 78 - 100 fl    MCH 28.8 26.5 - 33.0 pg    MCHC 32.5 31.5 - 36.5 g/dL    RDW 12.4 10.0 - 15.0 %    Platelet Count 343 150 - 450 10e9/L    % Neutrophils 81.2 %    % Lymphocytes 11.6 %    % Monocytes 6.0 %    % Eosinophils 1.0 %    % Basophils 0.2 %    Absolute Neutrophil 12.3 (H) 1.6 - 8.3 10e9/L    Absolute Lymphocytes 1.8 0.8 - 5.3 10e9/L    Absolute Monocytes 0.9 0.0 - 1.3 10e9/L    Absolute Eosinophils 0.2 0.0 - 0.7 10e9/L    Absolute Basophils 0.0 0.0 - 0.2 10e9/L    Diff Method Automated Method    UA with Microscopic reflex to Culture   Result Value Ref Range    Color Urine Yellow     Appearance Urine Clear     Glucose Urine Negative NEG^Negative mg/dL    Bilirubin Urine Negative NEG^Negative    Ketones Urine Trace (A) NEG^Negative mg/dL    Specific Gravity Urine 1.020 1.003 - 1.035    pH Urine 8.0 (H) 5.0 - 7.0 pH    Protein Albumin Urine Negative NEG^Negative mg/dL    Urobilinogen Urine 1.0 0.2 - 1.0 EU/dL    Nitrite Urine Negative NEG^Negative    Blood Urine Trace (A) NEG^Negative    Leukocyte Esterase Urine Negative NEG^Negative    Source Midstream Urine     WBC Urine 0 - 5 OTO5^0 - 5 /HPF    RBC Urine O - 2 OTO2^O - 2 /HPF       ASSESSMENT/PLAN:  (R10.84) Abdominal pain, generalized  (primary encounter diagnosis)  Comment: left sided  Plan: CBC with platelets and differential,         Comprehensive metabolic panel (BMP + Alb, Alk         Phos, ALT, AST, Total. Bili, TP), UA with         Microscopic reflex to Culture, Urine Culture         Aerobic Bacterial            Patient here with worsening abdominal pain, left sided with h/o diverticulitis.  Elevated WBC, reviewed that with symptoms that is concerning for diverticulitis but may require further evaluation.  Urine mildly abnormal, will send for  urine culture to make sure, did not present with typical UTI presentation.  Patient to go to ER for further evaluation via private car to [facility].  ER notified.    Ren Jaun MD  February 17, 2019 11:39 AM

## 2019-02-18 LAB
BACTERIA SPEC CULT: NORMAL
BACTERIA SPEC CULT: NORMAL
SPECIMEN SOURCE: NORMAL

## 2023-12-04 ENCOUNTER — HOSPITAL ENCOUNTER (EMERGENCY)
Facility: CLINIC | Age: 48
Discharge: HOME | End: 2023-12-04
Attending: STUDENT IN AN ORGANIZED HEALTH CARE EDUCATION/TRAINING PROGRAM | Admitting: STUDENT IN AN ORGANIZED HEALTH CARE EDUCATION/TRAINING PROGRAM
Payer: COMMERCIAL

## 2023-12-04 ENCOUNTER — APPOINTMENT (OUTPATIENT)
Dept: MRI IMAGING | Facility: CLINIC | Age: 48
End: 2023-12-04
Attending: STUDENT IN AN ORGANIZED HEALTH CARE EDUCATION/TRAINING PROGRAM
Payer: COMMERCIAL

## 2023-12-04 VITALS
SYSTOLIC BLOOD PRESSURE: 145 MMHG | OXYGEN SATURATION: 99 % | HEART RATE: 82 BPM | DIASTOLIC BLOOD PRESSURE: 92 MMHG | TEMPERATURE: 98.3 F | RESPIRATION RATE: 18 BRPM

## 2023-12-04 DIAGNOSIS — R42 DIZZINESS: ICD-10-CM

## 2023-12-04 DIAGNOSIS — R93.0 ABNORMAL ANGIOGRAM OF HEAD: ICD-10-CM

## 2023-12-04 DIAGNOSIS — R93.89 ABNORMAL MAGNETIC RESONANCE ANGIOGRAPHY (MRA) OF NECK: Primary | ICD-10-CM

## 2023-12-04 LAB
ANION GAP SERPL CALCULATED.3IONS-SCNC: 12 MMOL/L (ref 7–15)
ATRIAL RATE - MUSE: 79 BPM
BASOPHILS # BLD AUTO: 0 10E3/UL (ref 0–0.2)
BASOPHILS NFR BLD AUTO: 0 %
BUN SERPL-MCNC: 9 MG/DL (ref 6–20)
CALCIUM SERPL-MCNC: 8.6 MG/DL (ref 8.6–10)
CHLORIDE SERPL-SCNC: 102 MMOL/L (ref 98–107)
CREAT SERPL-MCNC: 0.73 MG/DL (ref 0.51–0.95)
DEPRECATED HCO3 PLAS-SCNC: 22 MMOL/L (ref 22–29)
DIASTOLIC BLOOD PRESSURE - MUSE: NORMAL MMHG
EGFRCR SERPLBLD CKD-EPI 2021: >90 ML/MIN/1.73M2
EOSINOPHIL # BLD AUTO: 0.3 10E3/UL (ref 0–0.7)
EOSINOPHIL NFR BLD AUTO: 3 %
ERYTHROCYTE [DISTWIDTH] IN BLOOD BY AUTOMATED COUNT: 12.4 % (ref 10–15)
GLUCOSE SERPL-MCNC: 94 MG/DL (ref 70–99)
HCG SERPL QL: NEGATIVE
HCT VFR BLD AUTO: 37.9 % (ref 35–47)
HGB BLD-MCNC: 12.5 G/DL (ref 11.7–15.7)
HOLD SPECIMEN: NORMAL
HOLD SPECIMEN: NORMAL
IMM GRANULOCYTES # BLD: 0 10E3/UL
IMM GRANULOCYTES NFR BLD: 0 %
INTERPRETATION ECG - MUSE: NORMAL
LYMPHOCYTES # BLD AUTO: 3.5 10E3/UL (ref 0.8–5.3)
LYMPHOCYTES NFR BLD AUTO: 44 %
MCH RBC QN AUTO: 28.9 PG (ref 26.5–33)
MCHC RBC AUTO-ENTMCNC: 33 G/DL (ref 31.5–36.5)
MCV RBC AUTO: 88 FL (ref 78–100)
MONOCYTES # BLD AUTO: 0.8 10E3/UL (ref 0–1.3)
MONOCYTES NFR BLD AUTO: 10 %
NEUTROPHILS # BLD AUTO: 3.5 10E3/UL (ref 1.6–8.3)
NEUTROPHILS NFR BLD AUTO: 43 %
NRBC # BLD AUTO: 0 10E3/UL
NRBC BLD AUTO-RTO: 0 /100
P AXIS - MUSE: 33 DEGREES
PLATELET # BLD AUTO: 326 10E3/UL (ref 150–450)
POTASSIUM SERPL-SCNC: 4 MMOL/L (ref 3.4–5.3)
PR INTERVAL - MUSE: 152 MS
QRS DURATION - MUSE: 72 MS
QT - MUSE: 384 MS
QTC - MUSE: 440 MS
R AXIS - MUSE: 2 DEGREES
RBC # BLD AUTO: 4.32 10E6/UL (ref 3.8–5.2)
SODIUM SERPL-SCNC: 136 MMOL/L (ref 135–145)
SYSTOLIC BLOOD PRESSURE - MUSE: NORMAL MMHG
T AXIS - MUSE: 17 DEGREES
TROPONIN T SERPL HS-MCNC: <6 NG/L
VENTRICULAR RATE- MUSE: 79 BPM
WBC # BLD AUTO: 8.1 10E3/UL (ref 4–11)

## 2023-12-04 PROCEDURE — A9585 GADOBUTROL INJECTION: HCPCS | Performed by: STUDENT IN AN ORGANIZED HEALTH CARE EDUCATION/TRAINING PROGRAM

## 2023-12-04 PROCEDURE — 36415 COLL VENOUS BLD VENIPUNCTURE: CPT | Performed by: EMERGENCY MEDICINE

## 2023-12-04 PROCEDURE — 99207 PR NO BILLABLE SERVICE THIS VISIT: CPT | Performed by: PHYSICIAN ASSISTANT

## 2023-12-04 PROCEDURE — 85025 COMPLETE CBC W/AUTO DIFF WBC: CPT | Performed by: EMERGENCY MEDICINE

## 2023-12-04 PROCEDURE — 84484 ASSAY OF TROPONIN QUANT: CPT | Performed by: EMERGENCY MEDICINE

## 2023-12-04 PROCEDURE — 80048 BASIC METABOLIC PNL TOTAL CA: CPT | Performed by: EMERGENCY MEDICINE

## 2023-12-04 PROCEDURE — 255N000002 HC RX 255 OP 636: Performed by: STUDENT IN AN ORGANIZED HEALTH CARE EDUCATION/TRAINING PROGRAM

## 2023-12-04 PROCEDURE — 70553 MRI BRAIN STEM W/O & W/DYE: CPT

## 2023-12-04 PROCEDURE — 70549 MR ANGIOGRAPH NECK W/O&W/DYE: CPT

## 2023-12-04 PROCEDURE — 70544 MR ANGIOGRAPHY HEAD W/O DYE: CPT | Mod: XU

## 2023-12-04 PROCEDURE — 99285 EMERGENCY DEPT VISIT HI MDM: CPT | Mod: 25

## 2023-12-04 PROCEDURE — 93005 ELECTROCARDIOGRAM TRACING: CPT

## 2023-12-04 PROCEDURE — 84703 CHORIONIC GONADOTROPIN ASSAY: CPT | Performed by: STUDENT IN AN ORGANIZED HEALTH CARE EDUCATION/TRAINING PROGRAM

## 2023-12-04 RX ORDER — GADOBUTROL 604.72 MG/ML
10 INJECTION INTRAVENOUS ONCE
Status: COMPLETED | OUTPATIENT
Start: 2023-12-04 | End: 2023-12-04

## 2023-12-04 RX ADMIN — GADOBUTROL 10 ML: 604.72 INJECTION INTRAVENOUS at 14:02

## 2023-12-04 ASSESSMENT — ACTIVITIES OF DAILY LIVING (ADL)
ADLS_ACUITY_SCORE: 35

## 2023-12-04 NOTE — ED NOTES
Tele-PIT/Intake Evaluation      Video-Visit Details    Type of service:  Video Visit    Video Start Time (time video started): 11:30 AM  Video End Time (time video stopped): 11:33 AM   Originating Location (pt. Location):  [facility]  Distant Location (provider location):  [facility]  Mode of Communication:  Video Conference via [employer]  Patient verbally consented to [employer] televisit.    History:  Patient states last week started with dizziness.  Vertigo with walking and looking at computer screen.  Also pain in back of neck.  [facility] and told high blood pressure.  Dull headache today.  Last few days with severe headache in back of head.  No history of high blood pressure in the past.  Does not take medication for high blood pressure.  She is feeling improved today but the [facility] told her likely her blood pressure was higher the last few days and she was feeling poorly.  No vomiting or diarrhea.      Exam:  General:  Alert, interactive  Cardiovascular:  Well perfused  Lungs:  No respiratory distress, no accessory muscle use  Neuro:  Moving all 4 extremities  Skin:  Warm, dry  Psych:  Normal affect    Patient Vitals for the past 24 hrs:   BP Pulse Resp SpO2   12/04/23 1132 (!) 161/86 85 16 99 %       Appropriate interventions for symptom management were initiated if applicable.  Appropriate diagnostic tests were initiated if indicated.    Important information for subsequent clinician:  [facility] with recent posterior headaches and dizziness has improved today.    I briefly evaluated the patient and developed an initial plan of care. I discussed this plan and explained that this brief interaction does not constitute a full evaluation. Patient/family understands that they should wait to be fully evaluated and discuss any test results with another clinician prior to leaving the hospital.       Shilpa Leija MD  12/04/23 1724

## 2023-12-04 NOTE — CONSULTS
"  [facility]    Stroke Telephone Note    I was called by Kirti Maldonado on 12/04/23 regarding patient Kirstin Ott. The patient is a 48 year old female with pertinent past medical history of asthma, anxiety, preDM, tobacco use disorder. Per med list on levonorgestrel-ethinyl estradiol and norethindrone.    She presented to the ED today due to elevated blood pressures and improved but persistent dizziness, blurred vision, posterior head/neck pain. She had URI symptoms x 2 weeks then over last weekend developed the above symptoms.  BP >160/100. She feels improvement today but still not entirely resolved. BP now 150/78.    Vitals  BP: (!) 150/78   Pulse: 78   Resp: 16   Temp: 98.3  F (36.8  C)        Imaging Findings  MRI brain: unremarkable  MRA brain/neck: 1. No evidence of large vessel occlusion or high-grade stenosis.  2. Slight fusiform dilatation of the right distal cervical internal  carotid artery with questionable subtle irregularity. Fibromuscular  dysplasia not excluded.     Impression  Dizziness, posterior neck pain/headache, MRI/A negative for stroke or dissection suspect 2/2 hypertension    Recommendations  -follow-up with vascular medicine to evaluate for FMD  -if new or persistent symptoms despite treatment of HTN then recommend repeat imaging    Case discussed with vascular neurology attending Dr. Zaman    My recommendations are based on the information provided over the phone by Kirstin Ott's in-person providers. They are not intended to replace the clinical judgment of her in-person providers. I was not requested to personally see or examine the patient at this time.     Greta Crawford PA-C  Vascular Neurology    To page me or covering stroke neurology team member, click here: [employer]COM  Choose \"On Call\" tab at top, then select \"NEUROLOGY/ALL SITES\" from middle drop-down box, press Enter, then look for \"stroke\" or \"telestroke\" for your site.   "

## 2023-12-04 NOTE — ED PROVIDER NOTES
History     Chief Complaint:  Hypertension       HPI   Kirstin Ott is a 48 year old female who presents from [facility] due to evaluation of hypertension in the setting of dizziness and severe headache.  Patient states that today she is actually feeling significantly improved although over the weekend had a severe occipital headache, double and blurry vision, room spinning dizziness.  She took ibuprofen for her headache and spent time resting.  When she was feeling so ill over the weekend she made an appointment with urgent care where she went this morning.  Her blood pressure was noted to be elevated at 158/110 and was referred to the ER.    Of note, she is just getting over severe URI-like illness with cough, runny nose, nasal congestion, ear fullness.  Denies hearing loss, unilateral numbness or weakness.  Symptoms came and went all week and felt a bit better yesterday but went to [facility] clinic appointment.  Was taking cough and cold medication although has not for the past 1 to 2 weeks.      Independent Historian:   None - Patient Only    Review of External Notes:   Urgent care note same day.  Patient seen for hypertension in the setting of dizziness and headache.  Referred to ER.      Medications:    amoxicillin-clavulanate (AUGMENTIN) 875-125 MG tablet  levonorgestrel-ethinyl estradiol (AVIANE,ALESSE,LESSINA) 0.1-20 MG-MCG per tablet  norethindrone (MICRONOR) 0.35 MG per tablet        Past Medical History:    Past Medical History:   Diagnosis Date    NO ACTIVE PROBLEMS        Past Surgical History:    Past Surgical History:   Procedure Laterality Date    [facility] NONSPECIFIC PROCEDURE      ?ovarian cyst in childhood        Physical Exam   Patient Vitals for the past 24 hrs:   BP Temp Temp src Pulse Resp SpO2   12/04/23 1529 (!) 143/85 -- -- 76 18 99 %   12/04/23 1525 -- -- -- -- -- 99 %   12/04/23 1438 (!) 150/78 -- -- 78 16 100 %   12/04/23 1230 -- -- -- -- -- 100 %   12/04/23 1200 -- -- -- -- -- 96 %    12/04/23 1133 -- 98.3  F (36.8  C) Temporal -- -- --   12/04/23 1132 (!) 161/86 -- -- 85 16 99 %        Physical Exam  General: Awake, alert, in no acute distress   HEENT: Atraumatic   EOM normal   External ears normal   Trachea midline  No mastoid tenderness bilaterally  Neck: Supple, normal ROM  CV: Regular rate, regular rhythm   No murmur   No lower extremity edema  2+ radial and DP pulses  PULM: Breath sounds normal bilaterally  No wheezes or rales  ABD: Soft, non-tender, non-distended  Normal bowel sounds   No rebound or guarding   MSK: No gross deformities  NEURO: Alert, no focal deficits. 5/5 strength in bilateral upper and lower extremities.  No gross sensory deficits.  Patient moves in a coordinated fashion.  Cranial nerves 2-12 intact.  Cerebellar testing intact  Skin: Warm, dry and intact      Emergency Department Course   ECG    ECG results from 12/04/23   EKG 12-lead, tracing only     Value    Systolic Blood Pressure     Diastolic Blood Pressure     Ventricular Rate 79    Atrial Rate 79    PA Interval 152    QRS Duration 72        QTc 440    P Axis 33    R AXIS 2    T Axis 17    Interpretation ECG      Sinus rhythm  Minimal voltage criteria for LVH, may be normal variant ( R in aVL )  Borderline ECG  No previous ECGs available  Confirmed by GENERATED REPORT, COMPUTER ([id number]),  Summer Keys ([id number]) on 12/4/2023 12:48:15 PM         Imaging:  MR Brain w/o & w Contrast   Final Result   IMPRESSION: Unremarkable MRI of the brain.       SYLVIA NDIAYE MD            SYSTEM ID:  [id number]      MRA Angiogram Head w/o Contrast   Final Result   IMPRESSION:   1. No evidence of large vessel occlusion or high-grade stenosis.   2. Slight fusiform dilatation of the right distal cervical internal   carotid artery with questionable subtle irregularity. Fibromuscular   dysplasia not excluded.       SYLVIA NDIAYE MD            SYSTEM ID:  [id number]      MRA Angiogram Neck w/o & w Contrast   Final Result    IMPRESSION:  No evidence of large vessel occlusion, high-grade   stenosis, or dissection. Slight fusiform dilatation of the right   distal cervical internal carotid artery (associated questionable   subtle irregularity better demonstrated on head MRA), nonspecific.   Fibromuscular dysplasia not excluded.          SYLVIA NDIAYE MD            SYSTEM ID:  [id number]             Laboratory:  Labs Ordered and Resulted from Time of ED Arrival to Time of ED Departure   BASIC METABOLIC PANEL - Normal       Result Value    Sodium 136      Potassium 4.0      Chloride 102      Carbon Dioxide (CO2) 22      Anion Gap 12      Urea Nitrogen 9.0      Creatinine 0.73      GFR Estimate >90      Calcium 8.6      Glucose 94     TROPONIN T, HIGH SENSITIVITY - Normal    Troponin T, High Sensitivity <6     HCG QUALITATIVE PREGNANCY - Normal    hCG Serum Qualitative Negative     CBC WITH PLATELETS AND DIFFERENTIAL    WBC Count 8.1      RBC Count 4.32      Hemoglobin 12.5      Hematocrit 37.9      MCV 88      MCH 28.9      MCHC 33.0      RDW 12.4      Platelet Count 326      % Neutrophils 43      % Lymphocytes 44      % Monocytes 10      % Eosinophils 3      % Basophils 0      % Immature Granulocytes 0      NRBCs per 100 WBC 0      Absolute Neutrophils 3.5      Absolute Lymphocytes 3.5      Absolute Monocytes 0.8      Absolute Eosinophils 0.3      Absolute Basophils 0.0      Absolute Immature Granulocytes 0.0      Absolute NRBCs 0.0          Procedures   None    Emergency Department Course & Assessments:             Interventions:  Medications   gadobutrol (GADAVIST) injection 10 mL (10 mLs Intravenous $Given 12/4/23 1407)        Assessments:  See ED course     Independent Interpretation (X-rays, CTs, rhythm strip):  None    Consultations/Discussion of Management or Tests:  None   ED Course as of 12/04/23 1639   Mon Dec 04, 2023   1600 Spoke with stroke neurology.  Agree no dissection visible on imaging.  Do not think angiography findings  explain her symptoms and presentation.  As long as her blood pressure and neuro exam is returned to normal, reasonable to discharge.  If her symptoms return can repeat MR imaging otherwise recommend follow-up with vascular neurology as an outpatient for further workup of possible fibromuscular dysplasia   1636 Reevaluation.  Vital signs improved.  Discussed results and plan       Social Determinants of Health affecting care:   None    Disposition:  The patient was discharged to home.     Impression & Plan    CMS Diagnoses: None      Medical Decision Making:  Patient presents from urgent care due to hypertension, headache, dizziness.  Her symptoms have predominantly resolved here.  She is normal neurologic exam.  Nonetheless, considered alternative etiologies such as cerebellar stroke, spontaneous dissection, inner ear pathology.    Given duration of symptoms, MR imaging will give us more information including small posterior stroke.  Imaging as above is negative for signs of posterior stroke.  There is evidence of sinusitis.  She has no mastoid tenderness on exam to suggest acute mastoiditis.  She has abnormal angiography although in discussion with stroke neurology this is unlikely to contribute to her presentation or symptomatology today.  As long as her neuroexam remains normal and she remains symptom-free, okay to follow-up with vascular neurology as an outpatient.    Given duration of URI-like illness, will treat with antibiotics for acute sinusitis for 10 days.  Encouraged the patient to take off a day of work to recover and rest.  Should her symptoms come back especially in the setting of headache and hypertension, she should return to the ER.  Patient was understanding agreement this plan.  All questions answered      Diagnosis:    ICD-10-CM    1. Abnormal magnetic resonance angiography (MRA) of neck  R93.89 Vascular Medicine Referral      2. Dizziness  R42       3. Abnormal angiogram of head  R93.0             Discharge Medications:  New Prescriptions    AMOXICILLIN-CLAVULANATE (AUGMENTIN) 875-125 MG TABLET    Take 1 tablet by mouth 2 times daily for 10 days          Kirti Maldonado DO  12/4/2023   Kirti Lau DO Pappas Richter, Ellen,   12/04/23 [id number]

## 2023-12-05 ENCOUNTER — TELEPHONE (OUTPATIENT)
Dept: OTHER | Facility: CLINIC | Age: 48
End: 2023-12-05
Payer: COMMERCIAL

## 2023-12-06 NOTE — TELEPHONE ENCOUNTER
Future Appointments   Date Time Provider Department Center   12/7/2023  1:10 PM Fabrizio Calle MD [facility]

## 2023-12-07 ENCOUNTER — OFFICE VISIT (OUTPATIENT)
Dept: OTHER | Facility: CLINIC | Age: 48
End: 2023-12-07
Attending: INTERNAL MEDICINE
Payer: COMMERCIAL

## 2023-12-07 VITALS
WEIGHT: 195 LBS | OXYGEN SATURATION: 99 % | DIASTOLIC BLOOD PRESSURE: 86 MMHG | HEART RATE: 88 BPM | HEIGHT: 66 IN | SYSTOLIC BLOOD PRESSURE: 133 MMHG | BODY MASS INDEX: 31.34 KG/M2

## 2023-12-07 DIAGNOSIS — R93.89 ABNORMAL MAGNETIC RESONANCE ANGIOGRAPHY (MRA) OF NECK: ICD-10-CM

## 2023-12-07 DIAGNOSIS — I77.3 FIBROMUSCULAR DYSPLASIA OF CAROTID ARTERY (H): Primary | ICD-10-CM

## 2023-12-07 PROCEDURE — 99205 OFFICE O/P NEW HI 60 MIN: CPT | Performed by: INTERNAL MEDICINE

## 2023-12-07 PROCEDURE — 99213 OFFICE O/P EST LOW 20 MIN: CPT | Performed by: INTERNAL MEDICINE

## 2023-12-07 RX ORDER — FLUOXETINE 40 MG/1
1 CAPSULE ORAL EVERY MORNING
COMMUNITY
Start: 2023-11-20

## 2023-12-07 RX ORDER — DOXYCYCLINE HYCLATE 100 MG
100 TABLET ORAL
COMMUNITY
Start: 2023-12-05 | End: 2023-12-15

## 2023-12-07 NOTE — PROGRESS NOTES
"Patient is here to discuss Consult    BP (!) 146/83 (BP Location: Right arm, Patient Position: Chair, Cuff Size: Adult Regular)   Pulse 86   Ht 5' 5.5\" (1.664 m)   Wt 195 lb (88.5 kg)   SpO2 99%   BMI 31.96 kg/m      Questions patient would like addressed today are: N/A.    Refills are needed: No    Has homecare services and agency name:  [facility]    "

## 2023-12-07 NOTE — PROGRESS NOTES
INITIAL VASCULAR MEDICAL ASSESSMENT  REFERRAL SOURCE: Greta Crawford PA-C    REASON FOR CONSULT: for evaluate for FMD given R   carotid abnormality on MRA     HPI: Faiza Ott is a 48 year old female who presented to the ED 12/04/2023 from [facility] clinic due to evaluation of hypertension in the setting of dizziness and severe headache.  The patient stated that she actually felt  significantly improved on 12/04/23 although over the preceding weekend she hd had a severe occipital headache, double and blurry vision,along with  room spinning dizziness.  She took ibuprofen for her headache and spent time resting.  When she was feeling so ill over the weekend she made an appointment with urgent care where she went the morning of 12/04/23.  Her blood pressure was noted to be elevated at 158/110 and was referred to the ER.     Of note, she had just been getting over severe URI-like illness with cough, runny nose, nasal congestion, ear fullness.  Denied hearing loss, unilateral numbness or weakness.  Symptoms came and went all week and felt a bit better 12/3/23 but went to [facility] clinic appointment.  Was taking cough and cold medication although has not for the past 1 to 2 weeks.    In the ED, she had a normal neurologic exam. Given the duration of symptoms, MRI/MRA of the head and neck was undertaken. It revealed no stroke or dissection. Incidentally discovered was slight fusiform dilatation of the right distal cervical internal carotid artery with a questionable subtle irregularity suggestive of FMD.    Review Of Systems  Skin: negative  Eyes: negative  Ears/Nose/Throat: negative  Respiratory: No shortness of breath, dyspnea on exertion, cough, or hemoptysis  Cardiovascular: negative  Gastrointestinal: negative  Genitourinary: negative  Musculoskeletal: negative  Neurologic: negative  Psychiatric: negative  Hematologic/Lymphatic/Immunologic: negative  Endocrine: negative      PAST MEDICAL HISTORY:                   Past Medical History:   Diagnosis Date    NO ACTIVE PROBLEMS        PAST SURGICAL HISTORY:                  Past Surgical History:   Procedure Laterality Date    ZZC NONSPECIFIC PROCEDURE      ?ovarian cyst in childhood       CURRENT MEDICATIONS:                  Current Outpatient Medications   Medication Sig Dispense Refill    amoxicillin-clavulanate (AUGMENTIN) 875-125 MG tablet Take 1 tablet by mouth 2 times daily for 10 days 20 tablet 0    levonorgestrel-ethinyl estradiol (AVIANE,ALESSE,LESSINA) 0.1-20 MG-MCG per tablet Take 1 tablet by mouth daily      norethindrone (MICRONOR) 0.35 MG per tablet Take 1 tablet (0.35 mg) by mouth daily Due for annual exam 2/2018 84 tablet 0       ALLERGIES:                No Known Allergies    SOCIAL HISTORY:                  Social History     Socioeconomic History    Marital status: Single     Spouse name: Not on file    Number of children: Not on file    Years of education: Not on file    Highest education level: Not on file   Occupational History    Not on file   Tobacco Use    Smoking status: Passive Smoke Exposure - Never Smoker    Smokeless tobacco: Never   Substance and Sexual Activity    Alcohol use: Yes     Alcohol/week: 0.0 standard drinks of alcohol    Drug use: No    Sexual activity: Yes     Partners: Male     Birth control/protection: Pill   Other Topics Concern    Not on file   Social History Narrative    Not on file     Social Determinants of Health     Financial Resource Strain: Not on file   Food Insecurity: Not on file   Transportation Needs: Not on file   Physical Activity: Not on file   Stress: Not on file   Social Connections: Not on file   Interpersonal Safety: Not on file   Housing Stability: Not on file       FAMILY HISTORY:                 No family history on file.      Physical exam Reveals:    O/P: WNL  HEENT: WNL  NECK: No JVD, thyromegaly, or lymphadenopathy  HEART: RRR, no murmurs, gallops, or rubs  LUNGS: CTA bilaterally without rales, wheezes,  or rhonchi  GI: NABS, nondistended, nontender, soft  EXT:without cyanosis, clubbing, or edema  NEURO: nonfocal  : no flank tenderness        MRI BRAIN WITHOUT AND WITH CONTRAST  12/4/2023 3:09 PM     HISTORY:  Dizziness, neck pain.      TECHNIQUE:  Multiplanar, multisequence MRI of the brain without and  with 10 mL Gadavist.     COMPARISON: None.     FINDINGS: The cerebral hemispheres, brainstem, and cerebellum  demonstrate normal morphology and signal. No evidence of ischemia,  hemorrhage, mass, or hydrocephalus. No abnormal enhancement or  diffusion restriction.     Marrow signal is within normal limits. Mild paranasal sinus mucosal  thickening. Small volume fluid signal within the bilateral mastoid  cavities, presumably inflammatory.                                                                      IMPRESSION: Unremarkable MRI of the brain.      SYLVIA NDIAYE MD        MR ANGIOGRAM OF THE HEAD WITHOUT CONTRAST   12/4/2023 3:09 PM      HISTORY: Dizziness, neck pain.     TECHNIQUE:  3D time-of-flight MR angiogram of the head without  contrast.     COMPARISON: None.     FINDINGS: No evidence of large vessel occlusion, high-grade stenosis,  aneurysm, or high flow vascular malformation. Incidental fetal origin  of the right posterior cerebral artery. Slight fusiform dilatation of  the right distal cervical internal carotid artery with questionable  subtle irregularity. Fibromuscular dysplasia not excluded.                                                                      IMPRESSION:  1. No evidence of large vessel occlusion or high-grade stenosis.  2. Slight fusiform dilatation of the right distal cervical internal  carotid artery with questionable subtle irregularity. Fibromuscular  dysplasia not excluded.      SYLVIA NDIAYE MD     A/P:      (I77.3) Fibromuscular dysplasia of carotid artery (H24)  (primary encounter diagnosis)  Comment: Check the below, RTC one week later.  Plan: CTA Head Neck with  Contrast, CTA Chest Abdomen         Pelvis w Contrast            (R93.89) Abnormal magnetic resonance angiography (MRA) of neck  Comment: as above  Plan: CTA Head Neck with Contrast, CTA Chest Abdomen         Pelvis w Contrast            62 minutes total medical care on today's date.

## 2023-12-11 ENCOUNTER — TELEPHONE (OUTPATIENT)
Dept: OTHER | Facility: CLINIC | Age: 48
End: 2023-12-11
Payer: COMMERCIAL

## 2023-12-11 DIAGNOSIS — R93.89 ABNORMAL MAGNETIC RESONANCE ANGIOGRAPHY (MRA) OF NECK: Primary | ICD-10-CM

## 2023-12-11 DIAGNOSIS — I77.3 FIBROMUSCULAR DYSPLASIA OF CAROTID ARTERY (H): ICD-10-CM

## 2023-12-11 NOTE — TELEPHONE ENCOUNTER
Follow-up to 12/7/23    CTA head/neck with contrast  CTA chest/abdomen/pelvis with contrast  Follow up one week later.  In clinic.            Latest Reference Range & Units Most Recent   Creatinine 0.51 - 0.95 mg/dL 0.73  12/4/23 11:48   GFR Estimate >60 mL/min/1.73m2 >90  12/4/23 11:48   No contrast allergy per chart review.

## 2023-12-11 NOTE — TELEPHONE ENCOUNTER
[facility]    Who is the name of the provider?:  NIC GIVNES   What is the location you see this provider at/preferred location?: [place]  Person calling / Facility: Kirstin Ott  Phone number:  [phone] (home)  Nurse call back needed:  NO     Reason for call:  LOV 12/7.    Patient has two CT's ordered, but no follow up advice.     [facility]    Roxy can do both CTs at same time in two back to back 20 min slots, other locations will not. If scheduled elsewhere, please book at least a week apart.    Pharmacy location:   n/a  Outside Imaging: n/a   Can we leave a detailed message on this number?  YES     12/11/2023, 2:09 PM

## 2023-12-11 NOTE — TELEPHONE ENCOUNTER
Future Appointments   Date Time Provider Department Center   [facility]   12/18/2023  5:20 PM [address] [facility]   12/28/2023  3:50 PM Fabrizio Calle MD [facility]

## 2023-12-18 ENCOUNTER — HOSPITAL ENCOUNTER (OUTPATIENT)
Dept: CT IMAGING | Facility: CLINIC | Age: 48
Discharge: HOME | End: 2023-12-18
Attending: INTERNAL MEDICINE
Payer: COMMERCIAL

## 2023-12-18 DIAGNOSIS — R93.89 ABNORMAL MAGNETIC RESONANCE ANGIOGRAPHY (MRA) OF NECK: ICD-10-CM

## 2023-12-18 DIAGNOSIS — I77.3 FIBROMUSCULAR DYSPLASIA OF CAROTID ARTERY (H): ICD-10-CM

## 2023-12-18 PROCEDURE — 70496 CT ANGIOGRAPHY HEAD: CPT

## 2023-12-18 PROCEDURE — 70498 CT ANGIOGRAPHY NECK: CPT

## 2023-12-18 PROCEDURE — 250N000011 HC RX IP 250 OP 636: Performed by: INTERNAL MEDICINE

## 2023-12-18 PROCEDURE — 250N000009 HC RX 250: Performed by: INTERNAL MEDICINE

## 2023-12-18 PROCEDURE — 74174 CTA ABD&PLVS W/CONTRAST: CPT

## 2023-12-18 RX ORDER — IOPAMIDOL 755 MG/ML
100 INJECTION, SOLUTION INTRAVASCULAR ONCE
Status: COMPLETED | OUTPATIENT
Start: 2023-12-18 | End: 2023-12-18

## 2023-12-18 RX ADMIN — SODIUM CHLORIDE 100 ML: 9 INJECTION, SOLUTION INTRAVENOUS at 18:05

## 2023-12-18 RX ADMIN — IOPAMIDOL 100 ML: 755 INJECTION, SOLUTION INTRAVENOUS at 18:05

## 2023-12-28 ENCOUNTER — OFFICE VISIT (OUTPATIENT)
Dept: OTHER | Facility: CLINIC | Age: 48
End: 2023-12-28
Attending: INTERNAL MEDICINE
Payer: COMMERCIAL

## 2023-12-28 VITALS
DIASTOLIC BLOOD PRESSURE: 81 MMHG | HEIGHT: 65 IN | WEIGHT: 198 LBS | HEART RATE: 100 BPM | SYSTOLIC BLOOD PRESSURE: 134 MMHG | OXYGEN SATURATION: 98 % | BODY MASS INDEX: 32.99 KG/M2

## 2023-12-28 DIAGNOSIS — I77.3 FIBROMUSCULAR DYSPLASIA OF CAROTID ARTERY (H): Primary | ICD-10-CM

## 2023-12-28 PROCEDURE — 99213 OFFICE O/P EST LOW 20 MIN: CPT | Performed by: INTERNAL MEDICINE

## 2023-12-28 PROCEDURE — 99214 OFFICE O/P EST MOD 30 MIN: CPT | Performed by: INTERNAL MEDICINE

## 2023-12-28 NOTE — PROGRESS NOTES
HPI: Faiza Ott is a 48 year old female who presented to the ED 12/04/2023 from [facility] due to evaluation of hypertension in the setting of dizziness and severe headache.  The patient stated that she actually felt  significantly improved on 12/04/23 although over the preceding weekend she hd had a severe occipital headache, double and blurry vision,along with  room spinning dizziness.  She took ibuprofen for her headache and spent time resting.  When she was feeling so ill over the weekend she made an appointment with urgent care where she went the morning of 12/04/23.  Her blood pressure was noted to be elevated at 158/110 and was referred to the ER.     Of note, she had just been getting over severe URI-like illness with cough, runny nose, nasal congestion, ear fullness.  Denied hearing loss, unilateral numbness or weakness.  Symptoms came and went all week and felt a bit better 12/3/23 but went to [facility] clinic appointment.  Was taking cough and cold medication although has not for the past 1 to 2 weeks.     In the ED, she had a normal neurologic exam. Given the duration of symptoms, MRI/MRA of the head and neck was undertaken. It revealed no stroke or dissection. Incidentally discovered was slight fusiform dilatation of the right distal cervical internal carotid artery with a questionable subtle irregularity suggestive of FMD.     Review Of Systems  Skin: negative  Eyes: negative  Ears/Nose/Throat: negative  Respiratory: No shortness of breath, dyspnea on exertion, cough, or hemoptysis  Cardiovascular: negative  Gastrointestinal: negative  Genitourinary: negative  Musculoskeletal: negative  Neurologic: negative  Psychiatric: negative  Hematologic/Lymphatic/Immunologic: negative  Endocrine: negative        PAST MEDICAL HISTORY:                  Past Medical History        Past Medical History:   Diagnosis Date    NO ACTIVE PROBLEMS              PAST SURGICAL HISTORY:                  Past Surgical  History         Past Surgical History:   Procedure Laterality Date    [facility] NONSPECIFIC PROCEDURE         ?ovarian cyst in childhood            CURRENT MEDICATIONS:                  Current Outpatient Prescriptions          Current Outpatient Medications   Medication Sig Dispense Refill    amoxicillin-clavulanate (AUGMENTIN) 875-125 MG tablet Take 1 tablet by mouth 2 times daily for 10 days 20 tablet 0    levonorgestrel-ethinyl estradiol (AVIANE,ALESSE,LESSINA) 0.1-20 MG-MCG per tablet Take 1 tablet by mouth daily        norethindrone (MICRONOR) 0.35 MG per tablet Take 1 tablet (0.35 mg) by mouth daily Due for annual exam 2/2018 84 tablet 0            ALLERGIES:                No Known Allergies     SOCIAL HISTORY:                  Social History   Social History            Socioeconomic History    Marital status: Single       Spouse name: Not on file    Number of children: Not on file    Years of education: Not on file    Highest education level: Not on file   Occupational History    Not on file   Tobacco Use    Smoking status: Passive Smoke Exposure - Never Smoker    Smokeless tobacco: Never   Substance and Sexual Activity    Alcohol use: Yes       Alcohol/week: 0.0 standard drinks of alcohol    Drug use: No    Sexual activity: Yes       Partners: Male       Birth control/protection: Pill   Other Topics Concern    Not on file   Social History Narrative    Not on file      Social Determinants of Health      Financial Resource Strain: Not on file   Food Insecurity: Not on file   Transportation Needs: Not on file   Physical Activity: Not on file   Stress: Not on file   Social Connections: Not on file   Interpersonal Safety: Not on file   Housing Stability: Not on file            FAMILY HISTORY:                   Family History   No family history on file.           Physical exam Reveals:     O/P: WNL  HEENT: WNL  NECK: No JVD, thyromegaly, or lymphadenopathy  HEART: RRR, no murmurs, gallops, or rubs  LUNGS: CTA  bilaterally without rales, wheezes, or rhonchi  GI: NABS, nondistended, nontender, soft  EXT:without cyanosis, clubbing, or edema  NEURO: nonfocal  : no flank tenderness           MRI BRAIN WITHOUT AND WITH CONTRAST  12/4/2023 3:09 PM     HISTORY:  Dizziness, neck pain.      TECHNIQUE:  Multiplanar, multisequence MRI of the brain without and  with 10 mL Gadavist.     COMPARISON: None.     FINDINGS: The cerebral hemispheres, brainstem, and cerebellum  demonstrate normal morphology and signal. No evidence of ischemia,  hemorrhage, mass, or hydrocephalus. No abnormal enhancement or  diffusion restriction.     Marrow signal is within normal limits. Mild paranasal sinus mucosal  thickening. Small volume fluid signal within the bilateral mastoid  cavities, presumably inflammatory.                                                                      IMPRESSION: Unremarkable MRI of the brain.      SYLVIA NDIAYE MD         MR ANGIOGRAM OF THE HEAD WITHOUT CONTRAST   12/4/2023 3:09 PM      HISTORY: Dizziness, neck pain.     TECHNIQUE:  3D time-of-flight MR angiogram of the head without  contrast.     COMPARISON: None.     FINDINGS: No evidence of large vessel occlusion, high-grade stenosis,  aneurysm, or high flow vascular malformation. Incidental fetal origin  of the right posterior cerebral artery. Slight fusiform dilatation of  the right distal cervical internal carotid artery with questionable  subtle irregularity. Fibromuscular dysplasia not excluded.                                                                      IMPRESSION:  1. No evidence of large vessel occlusion or high-grade stenosis.  2. Slight fusiform dilatation of the right distal cervical internal  carotid artery with questionable subtle irregularity. Fibromuscular  dysplasia not excluded.      SYLVIA NDIAYE MD         EXAM: CTA CHEST ABDOMEN PELVIS W CONTRAST  LOCATION: [facility]  DATE: 12/18/2023     INDICATION: carotid  FMD , please eval for other sites of FMD  COMPARISON: CT abdomen pelvis 2/17/2019  TECHNIQUE: CT angiogram chest abdomen pelvis during arterial phase of injection of IV contrast. 2D and 3D MIP reconstructions were performed by the CT technologist. Dose reduction techniques were used.   CONTRAST: 100mL isovue 370     FINDINGS:   CT ANGIOGRAM CHEST, ABDOMEN, AND PELVIS: Limited evaluation of the aortic root due to cardiac pulsation. The aorta is normal caliber with dissection, aneurysm, or significant stenosis. Mild irregularity of the renal arteries bilaterally. No stenosis or   aneurysms.     LUNGS AND PLEURA: Normal.     MEDIASTINUM/AXILLAE: Normal.     CORONARY ARTERY CALCIFICATION: None.     HEPATOBILIARY: Unchanged well-defined low-attenuation lesions in the liver, likely benign cysts or hemangiomas. No additional follow-up needed.     PANCREAS: Normal.     SPLEEN: Normal.     ADRENAL GLANDS: Normal.     KIDNEYS/BLADDER: No significant mass, stone, or hydronephrosis. Symmetric parenchymal enhancement. The bladder is completely collapsed.     BOWEL: Diverticulosis of the colon. No acute inflammatory change. No obstruction. Small hiatal hernia.     LYMPH NODES: Normal.     PELVIC ORGANS: Uterine fibroids.     MUSCULOSKELETAL: Normal.                                                                      IMPRESSION:  Mild mural irregularity of the renal arteries suggesting fibromuscular dysplasia. No associated stenosis or aneurysms. The rest of the vasculature is normal.     EXAM: CTA HEAD NECK W CONTRAST  LOCATION: [facility]  DATE: 12/18/2023     INDICATION:  Abnormal magnetic resonance angiography (MRA) of neck, Fibromuscular dysplasia of carotid artery (H24)  COMPARISON: Brain MRI and head and neck MRA 12/04/2023.  CONTRAST: 100mL isovue 370  TECHNIQUE: Head and neck CT angiogram with IV contrast. Axial helical CT images of the head and neck vessels obtained during the arterial phase of  intravenous contrast administration. Axial 2D reconstructed images and multiplanar 3D MIP reconstructed   images of the head and neck vessels were performed by the technologist. Dose reduction techniques were used. All stenosis measurements made according to NASCET criteria unless otherwise specified.     FINDINGS:   HEAD CTA:  ANTERIOR CIRCULATION: No stenosis/occlusion, aneurysm, or high flow vascular malformation. Fetal origin of the right posterior cerebral artery from the anterior circulation.     POSTERIOR CIRCULATION: No stenosis/occlusion, aneurysm, or high flow vascular malformation. Balanced vertebral arteries supply a normal basilar artery.      DURAL VENOUS SINUSES: Expected enhancement of the major dural venous sinuses.     NECK CTA:  RIGHT CAROTID: Thin carotid web along the posterior margin of the proximal internal carotid artery near its origin. Normal smooth contour of the mid and distal segments of the internal carotid artery. No focal plaque ulceration or evidence of dissection.   No measurable stenosis.     LEFT CAROTID: No measurable stenosis or dissection. Normal smooth arterial contour.     VERTEBRAL ARTERIES: No focal stenosis or dissection. Balanced vertebral arteries.     AORTIC ARCH: Classic aortic arch anatomy with no significant stenosis at the origin of the great vessels.     NONVASCULAR STRUCTURES: Unremarkable.                                                                      IMPRESSION:   HEAD CTA:   1.  Normal CTA [place].     NECK CTA:  1.  Right carotid web.  2.  Otherwise, normal smooth contour of the internal carotid arteries without evidence of fibromuscular dysplasia.  3.  Widely patent major cervical arteries. No stenosis, occlusion or evidence of dissection.      A/P:        (I77.3) Fibromuscular dysplasia of carotid artery (H24)  (primary encounter diagnosis)  Comment: She only has a single web on her CTA of the carotid. 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 As such, she has FMD. She is told to not engage in neck hyperextension or rapid rotation as this could lead to carotid dissection. She was told she does not need to have renal PTA for her renal FMD unless she has poorly controlled htn despite a three drug regimen including a diuretic. She was given information about the FMDSA should she wish to read more about this.   Plan: RTC prn                      30 minutes total medical care on today's date.

## 2023-12-28 NOTE — PROGRESS NOTES
"Patient is here to discuss follow up    /81 (BP Location: Right arm, Patient Position: Chair, Cuff Size: Adult Large)   Pulse 100   Ht 5' 5\" (1.651 m)   Wt 198 lb (89.8 kg)   SpO2 98%   BMI 32.95 kg/m      Questions patient would like addressed today are: N/A.    Refills are needed: No    Has homecare services and agency name:  [facility]"

## 2024-05-04 ENCOUNTER — HEALTH MAINTENANCE LETTER (OUTPATIENT)
Age: 49
End: 2024-05-04

## 2025-01-12 ENCOUNTER — HEALTH MAINTENANCE LETTER (OUTPATIENT)
Age: 50
End: 2025-01-12